# Patient Record
Sex: FEMALE | Race: WHITE
[De-identification: names, ages, dates, MRNs, and addresses within clinical notes are randomized per-mention and may not be internally consistent; named-entity substitution may affect disease eponyms.]

---

## 2018-06-19 ENCOUNTER — HOSPITAL ENCOUNTER (EMERGENCY)
Dept: HOSPITAL 80 - FED | Age: 83
Discharge: HOME | End: 2018-06-19
Payer: COMMERCIAL

## 2018-06-19 VITALS — SYSTOLIC BLOOD PRESSURE: 125 MMHG | DIASTOLIC BLOOD PRESSURE: 86 MMHG

## 2018-06-19 DIAGNOSIS — E86.9: ICD-10-CM

## 2018-06-19 DIAGNOSIS — Z79.01: ICD-10-CM

## 2018-06-19 DIAGNOSIS — R00.2: Primary | ICD-10-CM

## 2018-06-19 LAB — PLATELET # BLD: 268 10^3/UL (ref 150–400)

## 2018-06-19 NOTE — EDPHY
H & P


Stated Complaint: pt feels like having palpitations, hx of afib, mild nausea


Time Seen by Provider: 06/19/18 05:15


HPI/ROS: 





HPI


The patient presents with palpitations which began at about 3:30 a.m. This 

morning and have now resolved.  She was feeling well when she went to bed last 

night.  She is visiting [place] from [place]  She 

woke at 3:30 a.m. To go to the bathroom and then began to feel flushed and had 

palpitations.  She also thought her speech sounded garbled.  This was 

associated with mild nausea.  Her symptoms gradually subsided over the next 30 

min to 1 hr.  She now feels somewhat shaky.  She wonders if she could be 

dehydrated.





She does have a history of atrial fibrillation and takes Coumadin and diltiazem 

for this.  She has had multiple similar previous episodes involving palpitations

, flushing, garbled speech.  The last episode was in March while she was at a 

restaurant.  She has been seen by her cardiologist and neurologist as well as 

her primary care doctor for these episodes.  She had EEG, carotid ultrasound, 

MRI of brain, echocardiograph the which were all unremarkable in February of 

this year.  She has been advised to take a baby aspirin daily and she has been 

compliant with this.





REVIEW OF SYSTEMS


Constitutional:  No fever, no chills.


Eyes:  No discharge.


ENT:  No sore throat.


Cardiovascular:  No chest pain, no palpitations.


Respiratory:  No cough, no shortness of breath.


Gastrointestinal:  No abdominal pain, no vomiting.


Genitourinary:  No hematuria.


Musculoskeletal:  No back pain.


Skin:  No rashes.


Neurological:  No headache.





PMHx:  History of atrial fibrillation on Coumadin and diltiazem, hyperlipidemia





Soc Hx:  Visiting family from [place]








PHYSICAL


General Appearance: Alert, no distress


Eyes: Pupils equal and round no pallor or injection


ENT, Mouth: Mucous membranes moist


Respiratory: There are no retractions, lungs are clear to auscultation


Cardiovascular:  Regular rate and rhythm 


Gastrointestinal:  Abdomen is soft and non-tender, no masses, bowel sounds 

normal 


Neurological:  Alert and oriented x3, cranial nerves 2-12 intact, 5/5 strength 

in her upper and lower extremities which is symmetric, normal finger to nose 

testing, speech is fluid


Skin:  Warm and dry, no rashes


Musculoskeletal: Neck is supple non tender 


Extremities:  symmetrical, full range of motion 


Psychiatric:  Patient is oriented X 3, there is no agitation 





Source: Patient


Exam Limitations: No limitations





- Medical/Surgical History


Hx Asthma: No


Hx Chronic Respiratory Disease: No


Hx Diabetes: No


Hx Cardiac Disease: Yes


Hx Renal Disease: No


Hx Cirrhosis: No


Hx Alcoholism: No


Hx HIV/AIDS: No


Hx Splenectomy or Spleen Trauma: No


Other PMH: afib, hyperlipidemia, csection, R knee replace, cholecystectomy, 

orif R elbow, bilat carpal tunnel surg, trigger release bilat thumbs, 

hemmorhoid surg, nasal surg





- Social History


Smoking Status: Never smoked


Constitutional: 


 Initial Vital Signs











Temperature (C)  36.4 C   06/19/18 04:58


 


Heart Rate  79   06/19/18 04:58


 


Respiratory Rate  18   06/19/18 04:58


 


Blood Pressure  182/93 H  06/19/18 04:58


 


O2 Sat (%)  91 L  06/19/18 04:58








 











O2 Delivery Mode               Room Air














Allergies/Adverse Reactions: 


 





narcotic pain meds Adverse Reaction (Uncoded 06/19/18 05:06)


 








Home Medications: 














 Medication  Instructions  Recorded


 


Atorvastatin Calcium  06/19/18


 


Cardizem  06/19/18


 


Coumadin  06/19/18














Medical Decision Making





- Diagnostics


EKG Interpretation: 





EKG:  Complete interpretation has been separately recorded in the [id number] 

archive.  Summary impression:  Normal sinus rhythm





Differential Diagnosis: 





This is an 84-year-old female with history of atrial fibrillation who presents 

with palpitations, dizziness, garbled speech, weakness which she noticed at 3:

30 a.m. When she awoke to use the bathroom.  Here, she is slightly hypertensive 

though is in a normal sinus rhythm with a normal neurologic evaluation.  It 

seems that she has been previously evaluated for multiple prior episodes.  Her 

workup has included MRI of the brain, EEG, carotid ultrasound, echo which have 

all been unremarkable.  This makes TIA, CVA unlikely at this time.  She now 

complains of some mild shakiness though has full strength on exam.





She could have had an episode of atrial fibrillation with RVR and is now 

spontaneously converted to normal sinus rhythm.  She could also be experiencing 

electrolyte disturbance, dehydration, ACS.





Plan to check basic labs, EKG, give a dose of IV fluid.





Patient was monitored here on telemetry with no events.  Labs and EKG were 

unremarkable.  She received a dose of IV fluid and also drink water.  She is 

feeling much better on reassessment.  I plan to discharge her and I have 

discussed return precautions.  She is happy with this plan.





- Data Points


Laboratory Results: 


 Laboratory Results





 06/19/18 05:41 





 06/19/18 05:41 








Medications Given: 


 








Discontinued Medications





Sodium Chloride (Ns)  500 mls @ 1,000 mls/hr IV EDNOW ONE


   PRN Reason: Protocol


   Stop: 06/19/18 05:59


   Last Admin: 06/19/18 05:40 Dose:  500 mls





Point of Care Test Results: 


 Chemistry











  06/19/18





  05:43


 


POC Troponin I  0.00 ng/mL ng/mL





   (0.00-0.08) 














Departure





- Departure


Disposition: Home, Routine, Self-Care


Clinical Impression: 


 Palpitations





Condition: Good


Instructions:  Heart Palpitations (ED)


Additional Instructions: 


Please return to the emergency department if you are worse in any way.  Please 

make sure to drink plenty of fluids.


Referrals: 


NONE *PRIMARY CARE P,. [Primary Care Provider] - As per Instructions